# Patient Record
Sex: FEMALE | Race: WHITE | HISPANIC OR LATINO
[De-identification: names, ages, dates, MRNs, and addresses within clinical notes are randomized per-mention and may not be internally consistent; named-entity substitution may affect disease eponyms.]

---

## 2019-06-01 PROBLEM — Z00.00 ENCOUNTER FOR PREVENTIVE HEALTH EXAMINATION: Status: ACTIVE | Noted: 2019-06-01

## 2019-06-05 ENCOUNTER — APPOINTMENT (OUTPATIENT)
Dept: ANTEPARTUM | Facility: CLINIC | Age: 39
End: 2019-06-05
Payer: MEDICAID

## 2019-06-05 ENCOUNTER — ASOB RESULT (OUTPATIENT)
Age: 39
End: 2019-06-05

## 2019-06-05 PROCEDURE — 76817 TRANSVAGINAL US OBSTETRIC: CPT

## 2019-07-05 ENCOUNTER — APPOINTMENT (OUTPATIENT)
Dept: ANTEPARTUM | Facility: CLINIC | Age: 39
End: 2019-07-05

## 2019-07-08 ENCOUNTER — APPOINTMENT (OUTPATIENT)
Dept: ANTEPARTUM | Facility: CLINIC | Age: 39
End: 2019-07-08
Payer: MEDICAID

## 2019-07-08 ENCOUNTER — ASOB RESULT (OUTPATIENT)
Age: 39
End: 2019-07-08

## 2019-07-08 ENCOUNTER — APPOINTMENT (OUTPATIENT)
Dept: MATERNAL FETAL MEDICINE | Facility: CLINIC | Age: 39
End: 2019-07-08
Payer: MEDICAID

## 2019-07-08 PROCEDURE — 76813 OB US NUCHAL MEAS 1 GEST: CPT

## 2019-07-08 PROCEDURE — 99213 OFFICE O/P EST LOW 20 MIN: CPT | Mod: 25

## 2019-07-17 LAB
1ST TRIMESTER DATA: NORMAL
ADDENDUM DOC: NORMAL
AFP PNL SERPL: NORMAL
AFP SERPL-ACNC: NORMAL
CLINICAL BIOCHEMIST REVIEW: ABNORMAL
FREE BETA HCG 1ST TRIMESTER: NORMAL
Lab: NORMAL
NOTES NTD: NORMAL
NT: NORMAL
PAPP-A SERPL-ACNC: NORMAL
TRISOMY 18/3: NORMAL

## 2019-08-09 ENCOUNTER — APPOINTMENT (OUTPATIENT)
Dept: MATERNAL FETAL MEDICINE | Facility: CLINIC | Age: 39
End: 2019-08-09
Payer: MEDICAID

## 2019-08-09 ENCOUNTER — ASOB RESULT (OUTPATIENT)
Age: 39
End: 2019-08-09

## 2019-08-09 PROCEDURE — 99213 OFFICE O/P EST LOW 20 MIN: CPT

## 2019-08-13 LAB
1ST TRIMESTER DATA: NORMAL
2ND TRIMESTER DATA: NORMAL
AFP PNL SERPL: NORMAL
AFP SERPL-ACNC: NORMAL
AFP SERPL-ACNC: NORMAL
B-HCG FREE SERPL-MCNC: NORMAL
CLINICAL BIOCHEMIST REVIEW: NORMAL
FREE BETA HCG 1ST TRIMESTER: NORMAL
INHIBIN A SERPL-MCNC: NORMAL
NOTES NTD: NORMAL
NT: NORMAL
PAPP-A SERPL-ACNC: NORMAL
U ESTRIOL SERPL-SCNC: NORMAL

## 2019-08-23 ENCOUNTER — APPOINTMENT (OUTPATIENT)
Dept: ANTEPARTUM | Facility: CLINIC | Age: 39
End: 2019-08-23
Payer: MEDICAID

## 2019-08-23 ENCOUNTER — ASOB RESULT (OUTPATIENT)
Age: 39
End: 2019-08-23

## 2019-08-23 PROCEDURE — 76811 OB US DETAILED SNGL FETUS: CPT

## 2019-09-06 ENCOUNTER — ASOB RESULT (OUTPATIENT)
Age: 39
End: 2019-09-06

## 2019-09-06 ENCOUNTER — APPOINTMENT (OUTPATIENT)
Dept: ANTEPARTUM | Facility: CLINIC | Age: 39
End: 2019-09-06
Payer: MEDICAID

## 2019-09-06 PROCEDURE — 76816 OB US FOLLOW-UP PER FETUS: CPT

## 2019-09-17 ENCOUNTER — ASOB RESULT (OUTPATIENT)
Age: 39
End: 2019-09-17

## 2019-09-17 ENCOUNTER — APPOINTMENT (OUTPATIENT)
Dept: ANTEPARTUM | Facility: CLINIC | Age: 39
End: 2019-09-17

## 2019-09-17 ENCOUNTER — APPOINTMENT (OUTPATIENT)
Dept: MATERNAL FETAL MEDICINE | Facility: CLINIC | Age: 39
End: 2019-09-17
Payer: MEDICAID

## 2019-09-17 PROCEDURE — 99241 OFFICE CONSULTATION NEW/ESTAB PATIENT 15 MIN: CPT

## 2019-09-17 PROCEDURE — 99212 OFFICE O/P EST SF 10 MIN: CPT

## 2019-10-02 ENCOUNTER — APPOINTMENT (OUTPATIENT)
Dept: PEDIATRIC CARDIOLOGY | Facility: CLINIC | Age: 39
End: 2019-10-02

## 2019-10-08 ENCOUNTER — ASOB RESULT (OUTPATIENT)
Age: 39
End: 2019-10-08

## 2019-10-08 ENCOUNTER — APPOINTMENT (OUTPATIENT)
Dept: ANTEPARTUM | Facility: CLINIC | Age: 39
End: 2019-10-08
Payer: MEDICAID

## 2019-10-08 PROCEDURE — 76816 OB US FOLLOW-UP PER FETUS: CPT

## 2019-10-28 ENCOUNTER — OUTPATIENT (OUTPATIENT)
Dept: INPATIENT UNIT | Facility: HOSPITAL | Age: 39
LOS: 1 days | End: 2019-10-28
Payer: MEDICAID

## 2019-10-28 PROCEDURE — 99285 EMERGENCY DEPT VISIT HI MDM: CPT

## 2019-10-28 PROCEDURE — 76815 OB US LIMITED FETUS(S): CPT | Mod: 26

## 2019-10-28 PROCEDURE — 76700 US EXAM ABDOM COMPLETE: CPT | Mod: 26

## 2019-11-05 ENCOUNTER — ASOB RESULT (OUTPATIENT)
Age: 39
End: 2019-11-05

## 2019-11-05 ENCOUNTER — APPOINTMENT (OUTPATIENT)
Dept: ANTEPARTUM | Facility: CLINIC | Age: 39
End: 2019-11-05
Payer: MEDICAID

## 2019-11-05 PROCEDURE — 76816 OB US FOLLOW-UP PER FETUS: CPT

## 2019-11-14 ENCOUNTER — APPOINTMENT (OUTPATIENT)
Dept: MATERNAL FETAL MEDICINE | Facility: CLINIC | Age: 39
End: 2019-11-14

## 2019-11-18 ENCOUNTER — RX RENEWAL (OUTPATIENT)
Age: 39
End: 2019-11-18

## 2019-11-18 RX ORDER — LANCETS
EACH MISCELLANEOUS
Qty: 1 | Refills: 3 | Status: ACTIVE | COMMUNITY
Start: 2019-11-18 | End: 1900-01-01

## 2019-11-18 RX ORDER — BLOOD SUGAR DIAGNOSTIC
STRIP MISCELLANEOUS
Qty: 3 | Refills: 3 | Status: ACTIVE | COMMUNITY
Start: 2019-11-18 | End: 1900-01-01

## 2019-11-19 ENCOUNTER — ASOB RESULT (OUTPATIENT)
Age: 39
End: 2019-11-19

## 2019-11-19 ENCOUNTER — APPOINTMENT (OUTPATIENT)
Dept: MATERNAL FETAL MEDICINE | Facility: CLINIC | Age: 39
End: 2019-11-19
Payer: MEDICAID

## 2019-11-19 VITALS — HEIGHT: 63 IN | WEIGHT: 198 LBS | BODY MASS INDEX: 35.08 KG/M2

## 2019-11-19 DIAGNOSIS — Z78.9 OTHER SPECIFIED HEALTH STATUS: ICD-10-CM

## 2019-11-19 PROCEDURE — G0108 DIAB MANAGE TRN  PER INDIV: CPT

## 2019-11-25 ENCOUNTER — APPOINTMENT (OUTPATIENT)
Dept: PEDIATRIC CARDIOLOGY | Facility: CLINIC | Age: 39
End: 2019-11-25
Payer: MEDICAID

## 2019-11-25 DIAGNOSIS — O99.213 OBESITY COMPLICATING PREGNANCY, THIRD TRIMESTER: ICD-10-CM

## 2019-11-25 DIAGNOSIS — O35.9XX0 MATERNAL CARE FOR (SUSPECTED) FETAL ABNORMALITY AND DAMAGE, UNSPECIFIED, NOT APPLICABLE OR UNSPECIFIED: ICD-10-CM

## 2019-11-25 DIAGNOSIS — Z3A.33 33 WEEKS GESTATION OF PREGNANCY: ICD-10-CM

## 2019-11-25 PROCEDURE — 93325 DOPPLER ECHO COLOR FLOW MAPG: CPT | Mod: 59

## 2019-11-25 PROCEDURE — 76825 ECHO EXAM OF FETAL HEART: CPT

## 2019-11-25 PROCEDURE — 76821 MIDDLE CEREBRAL ARTERY ECHO: CPT

## 2019-11-25 PROCEDURE — 76820 UMBILICAL ARTERY ECHO: CPT

## 2019-11-25 PROCEDURE — 99204 OFFICE O/P NEW MOD 45 MIN: CPT | Mod: 25

## 2019-11-25 PROCEDURE — 76827 ECHO EXAM OF FETAL HEART: CPT

## 2019-12-10 ENCOUNTER — APPOINTMENT (OUTPATIENT)
Dept: MATERNAL FETAL MEDICINE | Facility: CLINIC | Age: 39
End: 2019-12-10
Payer: MEDICAID

## 2019-12-10 ENCOUNTER — APPOINTMENT (OUTPATIENT)
Dept: ANTEPARTUM | Facility: CLINIC | Age: 39
End: 2019-12-10
Payer: MEDICAID

## 2019-12-10 ENCOUNTER — ASOB RESULT (OUTPATIENT)
Age: 39
End: 2019-12-10

## 2019-12-10 VITALS
HEART RATE: 76 BPM | HEIGHT: 63 IN | WEIGHT: 198.38 LBS | RESPIRATION RATE: 18 BRPM | SYSTOLIC BLOOD PRESSURE: 108 MMHG | BODY MASS INDEX: 35.15 KG/M2 | OXYGEN SATURATION: 98 % | DIASTOLIC BLOOD PRESSURE: 70 MMHG

## 2019-12-10 DIAGNOSIS — Z87.59 PERSONAL HISTORY OF OTHER COMPLICATIONS OF PREGNANCY, CHILDBIRTH AND THE PUERPERIUM: ICD-10-CM

## 2019-12-10 DIAGNOSIS — O24.410 GESTATIONAL DIABETES MELLITUS IN PREGNANCY, DIET CONTROLLED: ICD-10-CM

## 2019-12-10 PROCEDURE — 99212 OFFICE O/P EST SF 10 MIN: CPT | Mod: 25

## 2019-12-10 PROCEDURE — 76816 OB US FOLLOW-UP PER FETUS: CPT

## 2019-12-10 NOTE — VITALS
[GA= ___ Weeks] : Results were GA of [unfilled] weeks [US Date: ___] : ultrasound performed on [unfilled]. [GA= ___ Days] : and [unfilled] day(s) [OG by US (date): ___] : The calculated OG by US is [unfilled] [By US] : this is the final OG

## 2019-12-10 NOTE — DISCUSSION/SUMMARY
[FreeTextEntry1] : Repeat sonogram for growth in 4 weeks. \par \par Weekly fetal testing at 36 weeks. \par \par Continue testing glucose 4X daily\par \par Repeat Medical nutrition consultation in 2 weeks.

## 2019-12-10 NOTE — DATA REVIEWED
[FreeTextEntry1] : Sonogram today shows good fetal growth at the 49th percentile. \par \par Overall glucose control is good, with fasting and post prandial values within normal limits. \par \par She will continue to adjust her diet as needed.

## 2019-12-10 NOTE — OB HISTORY
[___] : at [unfilled] weeks GA [Pregnancy History] : patient received anesthesia [LMP: ___] : LMP: [unfilled] [Sonogram] : sonogram [Spontaneous] : Spontaneous conception [at ___ wks] : at [unfilled] weeks [Definite:  ___ (Date)] : the last menstrual period was [unfilled] [Spotting Between  Menses] : no spotting between menses [Normal Amount/Duration] : was of a normal amount and duration [Frequency: Q ___ days] : menstrual periods occur approximately every [unfilled] days [Regular Cycle Intervals] : periods have been regular [Menarche Age: ____] : age at menarche was [unfilled] [Menstrual Cramps] : menstrual cramps [On BCP at conception] : the patient was not on BCP at conception

## 2019-12-10 NOTE — SURGICAL HISTORY
[Fibroids] : no fibroids [Abn Paps] : no abnormal pap smears [STI's] : no STI's [Breast Disease] : no breast disease [Infertility] : no infertility [OC Use] : no OC use [Cysts] : no cysts [Last Pap: ___] : Last Pap: [unfilled] [Last Mammo: ___] : Last Mammo: none

## 2019-12-16 ENCOUNTER — APPOINTMENT (OUTPATIENT)
Dept: ANTEPARTUM | Facility: CLINIC | Age: 39
End: 2019-12-16

## 2019-12-20 ENCOUNTER — APPOINTMENT (OUTPATIENT)
Dept: ANTEPARTUM | Facility: CLINIC | Age: 39
End: 2019-12-20

## 2019-12-24 ENCOUNTER — APPOINTMENT (OUTPATIENT)
Dept: ANTEPARTUM | Facility: CLINIC | Age: 39
End: 2019-12-24
Payer: MEDICAID

## 2019-12-24 ENCOUNTER — ASOB RESULT (OUTPATIENT)
Age: 39
End: 2019-12-24

## 2019-12-24 ENCOUNTER — APPOINTMENT (OUTPATIENT)
Dept: MATERNAL FETAL MEDICINE | Facility: CLINIC | Age: 39
End: 2019-12-24
Payer: MEDICAID

## 2019-12-24 VITALS — BODY MASS INDEX: 36.38 KG/M2 | HEIGHT: 63 IN | WEIGHT: 205.31 LBS

## 2019-12-24 PROCEDURE — 76818 FETAL BIOPHYS PROFILE W/NST: CPT

## 2019-12-24 PROCEDURE — G0108 DIAB MANAGE TRN  PER INDIV: CPT

## 2019-12-27 ENCOUNTER — APPOINTMENT (OUTPATIENT)
Dept: ANTEPARTUM | Facility: CLINIC | Age: 39
End: 2019-12-27

## 2019-12-31 ENCOUNTER — ASOB RESULT (OUTPATIENT)
Age: 39
End: 2019-12-31

## 2019-12-31 ENCOUNTER — APPOINTMENT (OUTPATIENT)
Dept: ANTEPARTUM | Facility: CLINIC | Age: 39
End: 2019-12-31
Payer: MEDICAID

## 2019-12-31 PROCEDURE — 76818 FETAL BIOPHYS PROFILE W/NST: CPT

## 2019-12-31 PROCEDURE — 76820 UMBILICAL ARTERY ECHO: CPT

## 2020-01-03 ENCOUNTER — OUTPATIENT (OUTPATIENT)
Dept: INPATIENT UNIT | Facility: HOSPITAL | Age: 40
LOS: 1 days | End: 2020-01-03

## 2020-01-03 ENCOUNTER — APPOINTMENT (OUTPATIENT)
Dept: ANTEPARTUM | Facility: CLINIC | Age: 40
End: 2020-01-03

## 2020-01-06 ENCOUNTER — INPATIENT (INPATIENT)
Facility: HOSPITAL | Age: 40
LOS: 1 days | Discharge: HOME | End: 2020-01-08
Attending: OBSTETRICS & GYNECOLOGY | Admitting: OBSTETRICS & GYNECOLOGY

## 2020-01-07 ENCOUNTER — APPOINTMENT (OUTPATIENT)
Dept: ANTEPARTUM | Facility: CLINIC | Age: 40
End: 2020-01-07

## 2020-01-07 ENCOUNTER — APPOINTMENT (OUTPATIENT)
Dept: MATERNAL FETAL MEDICINE | Facility: CLINIC | Age: 40
End: 2020-01-07

## 2020-01-10 ENCOUNTER — APPOINTMENT (OUTPATIENT)
Dept: ANTEPARTUM | Facility: CLINIC | Age: 40
End: 2020-01-10